# Patient Record
Sex: FEMALE
[De-identification: names, ages, dates, MRNs, and addresses within clinical notes are randomized per-mention and may not be internally consistent; named-entity substitution may affect disease eponyms.]

---

## 2024-01-12 ENCOUNTER — RX ONLY (OUTPATIENT)
Age: 49
Setting detail: RX ONLY
End: 2024-01-12

## 2024-01-12 ENCOUNTER — APPOINTMENT (RX ONLY)
Dept: URBAN - METROPOLITAN AREA CLINIC 88 | Facility: CLINIC | Age: 49
Setting detail: DERMATOLOGY
End: 2024-01-12

## 2024-01-12 DIAGNOSIS — Z01.818 ENCOUNTER FOR OTHER PREPROCEDURAL EXAMINATION: ICD-10-CM

## 2024-01-12 PROCEDURE — ? ADDITIONAL NOTES

## 2024-01-12 RX ORDER — OXYCODONE HYDROCHLORIDE AND ACETAMINOPHEN 5; 325 MG/1; MG/1
TABLET ORAL
Qty: 28 | Refills: 0 | Status: ERX | COMMUNITY
Start: 2024-01-12

## 2024-01-12 RX ORDER — CYCLOBENZAPRINE HYDROCHLORIDE 5 MG/1
TABLET, FILM COATED ORAL
Qty: 30 | Refills: 0 | Status: ERX | COMMUNITY
Start: 2024-01-12

## 2024-01-12 RX ORDER — CEPHALEXIN 500 MG/1
CAPSULE ORAL
Qty: 21 | Refills: 2 | Status: ERX | COMMUNITY
Start: 2024-01-12

## 2024-01-12 RX ORDER — ONDANSETRON 4 MG/1
TABLET, ORALLY DISINTEGRATING ORAL
Qty: 15 | Refills: 0 | Status: ERX | COMMUNITY
Start: 2024-01-12

## 2024-01-12 RX ORDER — APREPITANT 40 MG/1
CAPSULE ORAL
Qty: 1 | Refills: 0 | Status: ERX | COMMUNITY
Start: 2024-01-12

## 2024-01-12 NOTE — PROCEDURE: ADDITIONAL NOTES
Additional Notes: SX Date: 02/05/24 SX - Explant, lift (tbd).\\nPre-op in person 01/12/24\\n\\nHT: 5;7 / WT: 160 lbs\\nAllergies: NKA\\nPMH: Anxiety/Panic attacks(every night, more often for the last two months); High Blood pressure ; Had non-invasive venous insufficiency treatment to B LE, skin squamous/basal (not sure) cell carcinoma on the back.\\nHospitalizations: out pt sx.\\nMeds: Alprazolam (night time as needed); Progesterone , HRT hormone cream( stop estrogen); Sertraline (every morning); Propranolol (daily).\\nMagnesium+Ashwagandha; Vitamin D3; Turmeric Curcumin + Ginger & BioPerine, [place]-3 Fish Oil. (stopped this week).\\nSX HX: Total Hysterectomy- 2020; Breast Augmentation- 2006; Tibia/Fibia fx- 1989. D&C.\\nHx of Nausea after sx: No.\\nSmoke/Vape/Hookah: Denies.\\nETOH: Socially.\\nImplants: Yes.\\nPath: Surgeon's discretion.\\n\\nExparel: Yes.\\nMyers: Yes.\\nBB: Yes.\\n\\nHusband will be taking care of her.\\nMeds sent to pharmacy x 4 and Percocet PRN pain \"acute pain exception\".\\nFollow up visits scheduled, patient notified.\\nLabwork CBC w Diff, CMP / PT/ PTT/ INR/ EKG /\\nFollows up w cardiologist for hypertension, had cardiac clearance given for surgery, will send letter.\\nPhotos done in office.\\nRecommendations prior to surgery were given.\\nStop blood thinners 2 weeks prior.\\nNicotine test day of sx.\\nPt verbalized understanding and agreement.
Render Risk Assessment In Note?: no

## 2024-02-05 ENCOUNTER — APPOINTMENT (RX ONLY)
Dept: URBAN - METROPOLITAN AREA CLINIC 88 | Facility: CLINIC | Age: 49
Setting detail: DERMATOLOGY
End: 2024-02-05

## 2024-02-05 DIAGNOSIS — Z41.9 ENCOUNTER FOR PROCEDURE FOR PURPOSES OTHER THAN REMEDYING HEALTH STATE, UNSPECIFIED: ICD-10-CM

## 2024-02-05 PROCEDURE — ? OPERATIVE NOTE - BRIEF

## 2024-02-05 NOTE — PROCEDURE: OPERATIVE NOTE - BRIEF
Surgeon: Dr. Mk Ladd DO FACS
Estimated Blood Loss (Cc): minimal
Detail Level: Detailed
Epidermal Closure: horizontal mattress
Position: supine
Monitoring: Full continuous cardiac monitoring and automated blood-pressure measurements were performed per protocol.

## 2024-02-06 ENCOUNTER — APPOINTMENT (RX ONLY)
Dept: URBAN - METROPOLITAN AREA CLINIC 88 | Facility: CLINIC | Age: 49
Setting detail: DERMATOLOGY
End: 2024-02-06

## 2024-02-06 PROCEDURE — ? COUNSELING - POST-OP CHECK, BREAST IMPLANT REMOVAL

## 2024-02-06 PROCEDURE — 99024 POSTOP FOLLOW-UP VISIT: CPT

## 2024-02-06 NOTE — PROCEDURE: COUNSELING - POST-OP CHECK, BREAST IMPLANT REMOVAL
Count Minor/Major Decisions Toward Mdm (Not Cosmetic)?: No
Add Postop Global No-Charge Code (25038)?: yes
Detail Level: Simple

## 2024-02-12 ENCOUNTER — APPOINTMENT (RX ONLY)
Dept: URBAN - METROPOLITAN AREA CLINIC 88 | Facility: CLINIC | Age: 49
Setting detail: DERMATOLOGY
End: 2024-02-12

## 2024-02-12 PROCEDURE — ? COUNSELING - POST-OP CHECK, BREAST IMPLANT REMOVAL

## 2024-02-12 PROCEDURE — 99024 POSTOP FOLLOW-UP VISIT: CPT

## 2024-02-12 NOTE — PROCEDURE: COUNSELING - POST-OP CHECK, BREAST IMPLANT REMOVAL
Detail Level: Simple
Add Postop Global No-Charge Code (18368)?: yes
Count Minor/Major Decisions Toward Mdm (Not Cosmetic)?: No

## 2024-02-23 ENCOUNTER — APPOINTMENT (RX ONLY)
Dept: URBAN - METROPOLITAN AREA CLINIC 88 | Facility: CLINIC | Age: 49
Setting detail: DERMATOLOGY
End: 2024-02-23

## 2024-03-07 ENCOUNTER — APPOINTMENT (RX ONLY)
Dept: URBAN - METROPOLITAN AREA CLINIC 88 | Facility: CLINIC | Age: 49
Setting detail: DERMATOLOGY
End: 2024-03-07